# Patient Record
Sex: FEMALE | Race: BLACK OR AFRICAN AMERICAN | NOT HISPANIC OR LATINO | URBAN - METROPOLITAN AREA
[De-identification: names, ages, dates, MRNs, and addresses within clinical notes are randomized per-mention and may not be internally consistent; named-entity substitution may affect disease eponyms.]

---

## 2017-03-17 ENCOUNTER — EMERGENCY (EMERGENCY)
Facility: HOSPITAL | Age: 22
LOS: 1 days | Discharge: HOME | End: 2017-03-17
Attending: EMERGENCY MEDICINE | Admitting: EMERGENCY MEDICINE
Payer: MEDICAID

## 2017-03-17 VITALS
DIASTOLIC BLOOD PRESSURE: 66 MMHG | SYSTOLIC BLOOD PRESSURE: 113 MMHG | OXYGEN SATURATION: 100 % | TEMPERATURE: 98 F | RESPIRATION RATE: 16 BRPM | HEART RATE: 83 BPM

## 2017-03-17 VITALS
RESPIRATION RATE: 16 BRPM | DIASTOLIC BLOOD PRESSURE: 48 MMHG | SYSTOLIC BLOOD PRESSURE: 93 MMHG | TEMPERATURE: 99 F | OXYGEN SATURATION: 100 % | HEART RATE: 74 BPM

## 2017-03-17 LAB
ALBUMIN SERPL ELPH-MCNC: 4.3 G/DL — SIGNIFICANT CHANGE UP (ref 3.3–5)
ALP SERPL-CCNC: 45 U/L — SIGNIFICANT CHANGE UP (ref 40–120)
ALT FLD-CCNC: 7 U/L — SIGNIFICANT CHANGE UP (ref 4–33)
AST SERPL-CCNC: 15 U/L — SIGNIFICANT CHANGE UP (ref 4–32)
BASOPHILS # BLD AUTO: 0.03 K/UL — SIGNIFICANT CHANGE UP (ref 0–0.2)
BASOPHILS NFR BLD AUTO: 0.5 % — SIGNIFICANT CHANGE UP (ref 0–2)
BILIRUB SERPL-MCNC: 0.7 MG/DL — SIGNIFICANT CHANGE UP (ref 0.2–1.2)
BUN SERPL-MCNC: 15 MG/DL — SIGNIFICANT CHANGE UP (ref 7–23)
CALCIUM SERPL-MCNC: 9.4 MG/DL — SIGNIFICANT CHANGE UP (ref 8.4–10.5)
CHLORIDE SERPL-SCNC: 103 MMOL/L — SIGNIFICANT CHANGE UP (ref 98–107)
CK SERPL-CCNC: 46 U/L — SIGNIFICANT CHANGE UP (ref 25–170)
CO2 SERPL-SCNC: 22 MMOL/L — SIGNIFICANT CHANGE UP (ref 22–31)
CREAT SERPL-MCNC: 0.8 MG/DL — SIGNIFICANT CHANGE UP (ref 0.5–1.3)
EOSINOPHIL # BLD AUTO: 0.05 K/UL — SIGNIFICANT CHANGE UP (ref 0–0.5)
EOSINOPHIL NFR BLD AUTO: 0.8 % — SIGNIFICANT CHANGE UP (ref 0–6)
GLUCOSE SERPL-MCNC: 113 MG/DL — HIGH (ref 70–99)
HCG SERPL-ACNC: < 5 MIU/ML — SIGNIFICANT CHANGE UP
HCT VFR BLD CALC: 37.9 % — SIGNIFICANT CHANGE UP (ref 34.5–45)
HGB BLD-MCNC: 12.8 G/DL — SIGNIFICANT CHANGE UP (ref 11.5–15.5)
IMM GRANULOCYTES NFR BLD AUTO: 0 % — SIGNIFICANT CHANGE UP (ref 0–1.5)
LYMPHOCYTES # BLD AUTO: 1.77 K/UL — SIGNIFICANT CHANGE UP (ref 1–3.3)
LYMPHOCYTES # BLD AUTO: 29.3 % — SIGNIFICANT CHANGE UP (ref 13–44)
MCHC RBC-ENTMCNC: 29.8 PG — SIGNIFICANT CHANGE UP (ref 27–34)
MCHC RBC-ENTMCNC: 33.8 % — SIGNIFICANT CHANGE UP (ref 32–36)
MCV RBC AUTO: 88.1 FL — SIGNIFICANT CHANGE UP (ref 80–100)
MONOCYTES # BLD AUTO: 0.39 K/UL — SIGNIFICANT CHANGE UP (ref 0–0.9)
MONOCYTES NFR BLD AUTO: 6.4 % — SIGNIFICANT CHANGE UP (ref 2–14)
NEUTROPHILS # BLD AUTO: 3.81 K/UL — SIGNIFICANT CHANGE UP (ref 1.8–7.4)
NEUTROPHILS NFR BLD AUTO: 63 % — SIGNIFICANT CHANGE UP (ref 43–77)
PLATELET # BLD AUTO: 244 K/UL — SIGNIFICANT CHANGE UP (ref 150–400)
PMV BLD: 11.5 FL — SIGNIFICANT CHANGE UP (ref 7–13)
POTASSIUM SERPL-MCNC: 4.3 MMOL/L — SIGNIFICANT CHANGE UP (ref 3.5–5.3)
POTASSIUM SERPL-SCNC: 4.3 MMOL/L — SIGNIFICANT CHANGE UP (ref 3.5–5.3)
PROT SERPL-MCNC: 7.9 G/DL — SIGNIFICANT CHANGE UP (ref 6–8.3)
RBC # BLD: 4.3 M/UL — SIGNIFICANT CHANGE UP (ref 3.8–5.2)
RBC # FLD: 12.4 % — SIGNIFICANT CHANGE UP (ref 10.3–14.5)
SODIUM SERPL-SCNC: 141 MMOL/L — SIGNIFICANT CHANGE UP (ref 135–145)
TROPONIN T SERPL-MCNC: < 0.06 NG/ML — SIGNIFICANT CHANGE UP (ref 0–0.06)
TSH SERPL-MCNC: 1.59 UIU/ML — SIGNIFICANT CHANGE UP (ref 0.27–4.2)
WBC # BLD: 6.05 K/UL — SIGNIFICANT CHANGE UP (ref 3.8–10.5)
WBC # FLD AUTO: 6.05 K/UL — SIGNIFICANT CHANGE UP (ref 3.8–10.5)

## 2017-03-17 PROCEDURE — 99285 EMERGENCY DEPT VISIT HI MDM: CPT | Mod: 25

## 2017-03-17 PROCEDURE — 71020: CPT | Mod: 26

## 2017-03-17 PROCEDURE — 93010 ELECTROCARDIOGRAM REPORT: CPT

## 2017-03-17 RX ORDER — SODIUM CHLORIDE 9 MG/ML
1000 INJECTION INTRAMUSCULAR; INTRAVENOUS; SUBCUTANEOUS ONCE
Qty: 0 | Refills: 0 | Status: COMPLETED | OUTPATIENT
Start: 2017-03-17 | End: 2017-03-17

## 2017-03-17 RX ADMIN — SODIUM CHLORIDE 1000 MILLILITER(S): 9 INJECTION INTRAMUSCULAR; INTRAVENOUS; SUBCUTANEOUS at 13:59

## 2017-03-17 NOTE — ED ADULT NURSE NOTE - OBJECTIVE STATEMENT
Received patient to room 28 with mom at bedside. patient is alert and oriented times three and is calm and cooperative. Patient evaluated by MD. IVL placed to left AC 20 gauge. Labs drawn and sent. Awaiting results and any further orders and disposition.   YESSICA Carpio

## 2017-03-17 NOTE — ED ADULT TRIAGE NOTE - CHIEF COMPLAINT QUOTE
Pt with history of autism had a syncopal episode in school lasting one minute, as per mom patient has no past medical history aside from autism, has been in her normal state of behavior, pt currently menstruating at this time, and sleeping more, pt awake and alert answering questions appropriately pt states prior to passing out she did not feel any different, denies chest pain, palpitations, sob, dizziness, weakness, or blurred vision

## 2017-03-17 NOTE — ED PROVIDER NOTE - MEDICAL DECISION MAKING DETAILS
s/p witnessed syncopal episode today, no seizure like activity, no similar episodes in the past - ekg with sinus arrythmia, normal QT interval, non-focal exam - labs, tele, IV hydration, will most likley dc with o/p cardio f/u with echo

## 2017-03-17 NOTE — ED PROVIDER NOTE - OBJECTIVE STATEMENT
Pt is 22 y/o female with PMHx of autism here for eval s/p syncopal episode earlier today. As per mother pt was in day program, mom was advised by staff that pt got dizzy and became unresponsive for about 1 minute - pt didn't fell down, didn't injure her head as she was hold up by the bystander. Pt herself offers no complaints. As per mother pt denies HA, visual changes, denies CP, SOB, palpitations before or after the episode, denies abd pain, n/v/d, currently on her period, denies possibility of pregnancy. denies recent travel/prolonged immobilization, personal/family hx of coagulopathies, non smoker, not on exogenous estrogen, denies illicit substances ue. There is no family hx early/unexplained cardiac deaths. (-) seizure like activity, no bladder/bowel incontinence reported, no tongue biting. As per mother pr hasn't had breakfast today.

## 2017-03-17 NOTE — ED PROVIDER NOTE - PLAN OF CARE
Rest, drink plenty of fluid, eat small frequent meals. Follow up with cardiology within 24-48 hours, referral list given. Return to ER for any fainting, sweating, chest pain, palpitations, shortness of breathe, weakness, numbness or any other concerns.

## 2017-03-17 NOTE — ED PROVIDER NOTE - PROGRESS NOTE DETAILS
HUSAM Guerra - Matteo truong, NAD. Pt currently denies lightheadedness, syncope, chest pain, palpitations. Mother at bedside states she is feeling well and much better. Amenable to being d/c home with outpatient cardiology f/u. HUSAM Guerra - Pt stables, NAD. Pt currently denies lightheadedness, syncope, chest pain, palpitations. Mother at bedside states she is feeling well and much better. Pt and Dr. Tran amenable for pt to be d/c home with outpatient cardiology f/u.

## 2024-09-19 NOTE — ED PROVIDER NOTE - ENMT NEGATIVE STATEMENT, MLM
"  Medication Question or Refill        What medication are you calling about (include dose and sig)?:     Pt is asking (and has twice) and \" is frustrated but this has been a ra experience per pt\"  [facility] states received a duplicate prescription - they did - the 2nd one was to be for the 90 day supply (regular RX) and the first one was 10 tabs to bridge pt due to being out.  Please re order the normal RX for citalopram (CELEXA) 20 MG tablet / 90- day supply and send TO [facility].  Thank you.    Preferred Pharmacy:       [facility] - [place], [place] - [address]  [address]  [facility] [address]  [facility] [address]  Phone: [phone] Fax: [phone] [address] [place] [address]  Phone: [phone] Fax: [phone]      Controlled Substance Agreement on file:   CSA -- Patient Level:    CSA: None found at the patient level.       Who prescribed the medication?: Shea/Arianne    Do you need a refill? Yes    When did you use the medication last? NONE LEFT    Patient offered an appointment? No    Do you have any questions or concerns?  No      Could we send this information to you in [facility] or would you prefer to receive a phone call?:   NILS HARRY    "
Adequate: hears normal conversation without difficulty
Ears: no ear pain and no hearing problems.Nose: no nasal congestion and no nasal drainage.Mouth/Throat: no dysphagia, no hoarseness and no throat pain.Neck: no lumps, no pain, no stiffness and no swollen glands.

## 2024-09-24 NOTE — ED PROVIDER NOTE - CARDIOVASCULAR [-], MLM
Refill request for:    Requested Prescriptions     Pending Prescriptions Disp Refills    clobetasol 0.05 % External Cream 60 g 0     Sig: Apply 1 Application  topically 2 (two) times daily.        Last Prescribed Quantity Refills   10/22/23 60 g 0     LOV 9/14/23     Patient was asked to follow-up in:  prn    Appointment scheduled: 10/3/2024 Rohit Parsons PA    Medication not on protocol.    Routed to A SCOTT Parsons      
no chest pain/no palpitations/no peripheral edema

## 2024-10-28 NOTE — ED PROVIDER NOTE - CARE PLAN
Assistance OOB with selected safe patient handling equipment if applicable/Assistance with ambulation/Communicate fall risk and risk factors to all staff, patient, and family/Provide visual cue: yellow wristband, yellow gown, etc/Reinforce activity limits and safety measures with patient and family/Call bell, personal items and telephone in reach/Instruct patient to call for assistance before getting out of bed/chair/stretcher/Non-slip footwear applied when patient is off stretcher/[place] to call system/Physically safe environment - no spills, clutter or unnecessary equipment/Purposeful Proactive Rounding/Room/bathroom lighting operational, light cord in reach
Principal Discharge DX:	Syncope  Instructions for follow-up, activity and diet:	Rest, drink plenty of fluid, eat small frequent meals. Follow up with cardiology within 24-48 hours, referral list given. Return to ER for any fainting, sweating, chest pain, palpitations, shortness of breathe, weakness, numbness or any other concerns.  Secondary Diagnosis:	Sinus arrhythmia